# Patient Record
Sex: MALE
[De-identification: names, ages, dates, MRNs, and addresses within clinical notes are randomized per-mention and may not be internally consistent; named-entity substitution may affect disease eponyms.]

---

## 2019-01-08 ENCOUNTER — HOSPITAL ENCOUNTER (OUTPATIENT)
Dept: HOSPITAL 39 - GMAM | Age: 44
End: 2019-01-08
Attending: FAMILY MEDICINE
Payer: COMMERCIAL

## 2019-01-08 DIAGNOSIS — Z12.5: Primary | ICD-10-CM

## 2019-03-06 ENCOUNTER — HOSPITAL ENCOUNTER (OUTPATIENT)
Dept: HOSPITAL 39 - US | Age: 44
End: 2019-03-06
Attending: FAMILY MEDICINE
Payer: COMMERCIAL

## 2019-03-06 DIAGNOSIS — E04.1: Primary | ICD-10-CM

## 2019-03-06 NOTE — US
US THYROID



CLINICAL STATEMENT: NODULE. . No palpable mass. No previous

thyroid surgery or previous/current therapy.



COMPARISON: None



TECHNIQUE: Transcutaneous scanning, grayscale and Doppler modes. 



FINDINGS:

Size right thyroid lobe: 6.5 x 3.2 x 3.1 cm

Size left thyroid lobe: 6.4 x 2.8 x 2.7 cm

Size isthmus: 1.1 cm



Estimated total number of nodules greater than or equal to 1 cm:

2.. Heterogeneous echoes throughout.





Nodule 1:

Size: 0.8 x 0.8 x 0.7 cm

Location: Right Lower

Composition: solid or almost completely solid: 2 points

Echogenicity: hyperechoic: 1 point

Shape: wider than tall: 0 points

Margins: smooth: 0 points

Echogenic foci: none: 0 points



ACR Total Points: 3;  ACR TI-RADS risk category: TR3 - mildly

suspicious nodule.





Nodule 2:

Size: 1.0 x 0.9 x 0.9 cm

Location: Left Mid

Composition: solid or almost completely solid: 2 points

Echogenicity: hyperechoic: 1 point

Shape: taller than wide: 3 points

Margins: smooth: 0 points

Echogenic foci: none: 0 points



ACR Total Points: 6;  ACR TI-RADS risk category: TR4 - moderately

suspicious nodule.





Nodule 3:

Size: 2.4 x 2.4 x 1.9 cm

Location: Left Lower

Composition: solid or almost completely solid: 2 points

Echogenicity: hypoechoic: 2 points

Shape: wider than tall: 0 points

Margins: smooth: 0 points

Echogenic foci: none: 0 points



ACR Total Points: 4;  ACR TI-RADS risk category: TR4 - 

moderately suspicious nodule.





Nodule 4:

Size: 1.1 x 0.6 cm

Location: Left Mid

Composition: solid or almost completely solid: 2 points

Echogenicity: hypoechoic: 2 points

Shape: wider than tall: 0 points

Margins: smooth: 0 points

Echogenic foci: none: 0 points



ACR Total Points: 4;  ACR TI-RADS risk category: TR4 - 

moderately suspicious nodule.





The soft tissue around the thyroid gland shows no dominant solid

mass or distinct cyst. No parenchymal edema or large

calcifications. No overlying skin changes. Normal vascularity.





IMPRESSION:

1.  Nodule 1: ACR TI-RADS 2017 Category TR4. Recommend: No

further follow-up..  Recommendations based upon [employer]

Practice recommendations and ACR TI-RADS 2017 guidelines. Please

see below*. 

2.  Nodule 2: ACR TI-RADS 2017 Category TR4. Recommend: 

Follow-up ultrasound in 1 year. 

3.  Nodule 3: ACR TI-RADS 2017 Category TR4. Recommend:

Ultrasound-guided fine needle aspiration 

4.  Nodule 4: ACR TI-RADS 2017 Category TR4. Recommend: 

Follow-up ultrasound in 1 year. 



Overall thyromegaly. Not hypervascular. Soft tissue around the

thyroid gland is unremarkable.





*ACR TI-RADS 2017 Recommendations:



TR1: No FNA or follow up

TR2: No FNA or follow up

TR3: FNA if >/= 2.5 cm, follow up if 1.5 - 2.4 cm in 1, 3, and 5

years

TR4: FNA if >/= 1.5 cm, follow up if 1.0 - 1.4 cm in 1, 2, 3, and

5 years

TR5: FNA if >/= 1.0 cm, follow up if 0.5 - 0.9 cm every year for

5 years



**ACR TI-RADS recommends that no more than two nodules with the

highest ACR TI-RADS total point should be biopsied and no more

than four nodules should be followed.



These recommendations do not apply to patients with increased

risk for thyroid cancer or patients with symptomatic thyroid

disease.







Electronically signed by:  Amrit Daigle MD  3/6/2019 12:44 PM [facility]

Workstation: [id number]

## 2019-10-08 ENCOUNTER — HOSPITAL ENCOUNTER (OUTPATIENT)
Dept: HOSPITAL 39 - GMAM | Age: 44
End: 2019-10-08
Attending: FAMILY MEDICINE
Payer: COMMERCIAL

## 2019-10-08 DIAGNOSIS — E04.1: Primary | ICD-10-CM

## 2019-10-08 DIAGNOSIS — I10: ICD-10-CM

## 2020-09-17 ENCOUNTER — HOSPITAL ENCOUNTER (OUTPATIENT)
Dept: HOSPITAL 39 - GMAM | Age: 45
End: 2020-09-17
Attending: FAMILY MEDICINE
Payer: COMMERCIAL

## 2020-09-17 DIAGNOSIS — R60.0: Primary | ICD-10-CM

## 2020-10-14 ENCOUNTER — HOSPITAL ENCOUNTER (OUTPATIENT)
Dept: HOSPITAL 39 - GMAM | Age: 45
End: 2020-10-14
Attending: FAMILY MEDICINE
Payer: COMMERCIAL

## 2020-10-14 DIAGNOSIS — Z12.5: ICD-10-CM

## 2020-10-14 DIAGNOSIS — E04.1: Primary | ICD-10-CM

## 2020-12-14 ENCOUNTER — HOSPITAL ENCOUNTER (EMERGENCY)
Dept: HOSPITAL 39 - ER | Age: 45
Discharge: HOME | End: 2020-12-14
Payer: COMMERCIAL

## 2020-12-14 VITALS — TEMPERATURE: 97.6 F

## 2020-12-14 VITALS — DIASTOLIC BLOOD PRESSURE: 94 MMHG | SYSTOLIC BLOOD PRESSURE: 150 MMHG | OXYGEN SATURATION: 99 %

## 2020-12-14 DIAGNOSIS — M25.511: ICD-10-CM

## 2020-12-14 DIAGNOSIS — Z79.899: ICD-10-CM

## 2020-12-14 DIAGNOSIS — I10: ICD-10-CM

## 2020-12-14 DIAGNOSIS — E78.5: ICD-10-CM

## 2020-12-14 DIAGNOSIS — M62.838: Primary | ICD-10-CM

## 2020-12-14 PROCEDURE — 93005 ELECTROCARDIOGRAM TRACING: CPT

## 2020-12-14 PROCEDURE — 73030 X-RAY EXAM OF SHOULDER: CPT

## 2020-12-14 NOTE — RAD
EXAM:  XR Right Shoulder Complete, 2 or More Views



CLINICAL HISTORY:  The patient is 45 years old and is Male;

shoulder pain



TECHNIQUE:  Two or more views of the right shoulder.



COMPARISON:  No relevant prior studies available.



FINDINGS:

  Bones/joints:  Degenerative changes of the right

acromioclavicular joint.

      No acute fracture.  No dislocation.

  Soft tissues:  Unremarkable.



IMPRESSION:     

  No acute findings in the right shoulder.



Electronically signed by:  Jasmeet Vallejo MD  12/14/2020 11:20

PM [facility] Workstation: [id number]

## 2020-12-14 NOTE — ED.PDOC
History of Present Illness





- General


Chief Complaint: Upper Extremity Injury


Stated Complaint: right shoulder pain c finger numbness


Time Seen by Provider: 12/14/20 22:57


Source: patient, RN notes reviewed


Additional Information: 


Patient went to the [facility] hypertension presented to the ER with a 3-day

history of right scapular right shoulder pain, no nausea, no fever no chills no 

shortness of breath no diaphoresis no sweating, patient said that the pain is 

worse when he moves could not lay in bed today because of pain, took some Advil 

p.m. without any improvement of symptoms.  No recent trauma no fever no chills 

patient does not be in any distress








- History of Present Illness


Timing/Duration: other - 3 days 


Improving Factors: nothing


Worsening Factors: nothing


Associated Symptoms: denies symptoms


Allergies/Adverse Reactions: 


Allergies





NO KNOWN ALLERGY Allergy (Verified 12/14/20 23:01)


   





Home Medications: 


Ambulatory Orders





Acetaminophen W/ Codeine [Tylenol W/ CODEINE #3] 1 ea PO Q6HRS #20 ea 12/14/20 


Amlodipine Besylate-Benazepril [Amlodipine Besylate/Benaz 5-20 mg]  12/14/20 


Atorvastatin Calcium [Lipitor]  12/14/20 


Cyclobenzaprine HCl [Flexeril] 0 mg PO Q6HRS #20 tab 12/14/20 


Methylprednisolone [Medrol Dose Leon] 4 mg PO DAILY 6 Days #21 tab 12/14/20 


Metoprolol Succinate [Metoprolol Succinate ER]  12/14/20 











Review of Systems





- Review of Systems


Constitutional: States: no symptoms reported


EENTM: States: no symptoms reported


Respiratory: States: no symptoms reported


Cardiology: States: no symptoms reported


Gastrointestinal/Abdominal: States: no symptoms reported


Genitourinary: States: no symptoms reported


Musculoskeletal: States: no symptoms reported


Skin: States: no symptoms reported


Neurological: States: no symptoms reported


Endocrine: States: no symptoms reported


Hematologic/Lymphatic: States: no symptoms reported





Physical Exam





- Physical Exam


General Appearance: Well Developed, Well Groomed, Well Hydrated


Eye Exam: bilateral normal


Ears, Nose, Throat: hearing grossly normal, normal ENT inspection, normal 

pharynx


Neck: non-tender, full range of motion, supple, normal inspection


Respiratory: chest non-tender, lungs clear, normal breath sounds, no respiratory

distress, no accessory muscle use


Cardiovascular/Chest: normal peripheral pulses, regular rate, rhythm, no edema, 

no gallop, no JVD, no murmur


Peripheral Pulses: radial,right: 2+, radial,left: 2+


Gastrointestinal/Abdominal: normal bowel sounds, non tender, soft, no 

organomegaly, no pulsatile mass


Back Exam: other - There is some right paraspinal pain, and some pain in the 

scapular area, pain with movement of the shoulder without any redness swelling 

or


Neurologic: CNs II-XII nml as tested, alert, normal mood/affect, oriented x 3


Skin Exam: normal color


Lymphatic: no adenopathy





Progress





- Progress


Progress: 


Patient EKG did not show any acute ischemic changes heart rate of 71  without 

any depressions or elevations


 x-ray does show some calcification but no fracture no dislocation no cortical 

fracture, this patient does have this point tenderness in the on the right upper

paraspinal area right scapular area extending into the shoulder but not the 

neck, patient denies any chest pain shortness of breath patient did receive a 

dose of Toradol, and will be discharged home with Flexeril Tylenol 3 and 

steroids, I recommended also using ice packs and warm compresses and alternating

between those 2 cm each. 


return to the ER if there is any fever chills chest pain shortness of breath 

nausea vomiting dizziness sensation of fainting excessive sweating crushing 

chest pain evaluation to the back sensation in the chest variation to left arm 

right arm both arms neck back or jaw or any other complaint





I suspect thatht this Patient is suffering from a muscle spasms


12/14/20 23:11








Departure





- Departure


Clinical Impression: 


 Muscle spasm





Disposition: Discharge to Home or Self Care


Condition: Fair


Departure Forms:  ED Discharge - Pt. Copy, Patient Portal Self Enrollment, Work 

Release Form


Instructions:  DI for Arm Pain, Muscle Spasms (DC)


Diet: resume usual diet


Referrals: 


Cecilio Rubio MD [Primary Care Provider] - 1-2 Weeks


Prescriptions: 


Cyclobenzaprine HCl [Flexeril] 0 mg PO Q6HRS #20 tab


Acetaminophen W/ Codeine [Tylenol W/ CODEINE #3] 1 ea PO Q6HRS #20 ea


Methylprednisolone [Medrol Dose Leon] 4 mg PO DAILY 6 Days #21 tab


Home Medications: 


Ambulatory Orders





Acetaminophen W/ Codeine [Tylenol W/ CODEINE #3] 1 ea PO Q6HRS #20 ea 12/14/20 


Amlodipine Besylate-Benazepril [Amlodipine Besylate/Benaz 5-20 mg]  12/14/20 


Atorvastatin Calcium [Lipitor]  12/14/20 


Cyclobenzaprine HCl [Flexeril] 0 mg PO Q6HRS #20 tab 12/14/20 


Methylprednisolone [Medrol Dose Leon] 4 mg PO DAILY 6 Days #21 tab 12/14/20 


Metoprolol Succinate [Metoprolol Succinate ER]  12/14/20